# Patient Record
(demographics unavailable — no encounter records)

---

## 2025-04-21 NOTE — PHYSICAL EXAM
[Normal Breath Sounds] : Normal breath sounds [Normal Heart Sounds] : normal heart sounds [Normal Rate and Rhythm] : normal rate and rhythm [No Rash or Lesion] : No rash or lesion [Alert] : alert [Oriented to Person] : oriented to person [Oriented to Place] : oriented to place [Oriented to Time] : oriented to time [Calm] : calm [Exam Deferred] : exam was deferred [de-identified] : Soft/ND [de-identified] : Left anterior healed incision and drainage site of perianal abscess. [de-identified] : NAD [de-identified] : NC/AT [de-identified] : Normal  [de-identified] : +ROM/BASSETT [de-identified] : Intact

## 2025-04-21 NOTE — REVIEW OF SYSTEMS
[Feeling Tired] : feeling tired [As Noted in HPI] : as noted in HPI [Negative] : Heme/Lymph [FreeTextEntry3] : wears glasses [FreeTextEntry5] : HTN

## 2025-04-21 NOTE — ASSESSMENT
[FreeTextEntry1] : 48-year-old female status post incision and drainage of left anterior perianal abscess here for evaluation.  With the help of her son providing translation I explained that the patient will most likely have a recurrence as these are frequently associated with anal fistula.  The patient should undergo examination under anesthesia fistulotomy possible seton.  Plan:  Schedule examination under anesthesia fistulotomy possible seton.

## 2025-04-21 NOTE — HISTORY OF PRESENT ILLNESS
[FreeTextEntry1] : 47 y/o female presents here for rectal abscess. Patient went to [facility] on 4/7 for 3 days of left perianal swelling and pain. They did a CT scan which showed Left perianal abscess collection with surrounding inflammatory stranding. She states they did an incision and rain and  was given antibiotics. Patient states she's feeling better. Daily BM without straining.  PMH: dm2, gerd, HTN Patient states she has never had a colonoscopy in the past.

## 2025-06-03 NOTE — HISTORY OF PRESENT ILLNESS
[FreeTextEntry1] : 48 yr old female with past medical history remarkable for HTN, HLD presenting to establish care for type 2 diabetes mellitus. Records were reviewed. Patient recently hospitalized 4/7-4/9/25: presented with 3 days of perianal swelling and pain, found to have left perianal abscess, underwent I&D by colorectal surgery team and tx with abx. A1c 10.7% and endocrine team consulted   Regarding diabetes history: Diagnosed with T2DM in ~2023, followed by PCP previously  A1c trend: 10.7%- 4/2025  Current regimen: - lantus 25 units qhs - metformin 1000 mg BID - ozempic 0.25 mg weekly  Previous therapies tried: - januvia 100 mg and glimepiride 4 mg (discontinued after recent 4/2025 hospitalization)  Glycemic monitoring:  Hypoglycemia symptoms/events:  Diet: Breakfast- Lunch- Dinner- Snacks- Sugary beverages-  Physical activity:  Family history of DM in parents  Diabetes complications and comorbidities: Retinopathy- ***, last seen by ophthalmology on  Neuropathy-  Nephropathy- denies Last GFR: 120 (4/23/25)   TONY-  HTN- yes on losartan 25 mg Coronary artery disease- CVA- Dyslipidemia- yes on lipitor 20 mg    Social: Lives with  Works as  Tobacco use-  ETOH-

## 2025-06-03 NOTE — ASSESSMENT
[FreeTextEntry1] : 48 yr old female with past medical history remarkable for HTN, HLD presenting to establish care for type 2 diabetes mellitus.    Type 2 Diabetes ***controlled with HbA1c ***%  Target HbA1c <7% Pre-prandial target  mg/dL Post-prandial target <180 mg/dL  Medication regimen: - continue metformin 1000 mg bid - continue lantus 25 units daily - ***increase ozempic We discussed the pros and cons of GLP-1/GLP-1 + GIP, including the benefits of glucose lowering, weight loss, cardiovascular and renal benefits and the warnings for pancreatitis, medullary thyroid cancer, and acute kidney injury. Patient is agreeable with starting a medication of this class.  I counseled that patient that if severe abdominal pain and nausea and vomiting occurs, the pt should stop GLP and go to the ER. We also discussed the more mild side effect of abdominal discomfort/nausea with GLP initiation, which should improve over time. The patient was instructed to let me know if the side effects are intolerable/do not improve.  Lifestyle modifications: Recommend increasing weekly physical activity with goal 150 minutes/week and dietary modifications- limiting sugary beverages and fried foods, incorporating more fruits/vegetables and whole grains in diet   Monitoring for complications: - Ophthalmology: due for retinopathy screening, referral provided today *** UTD, last eye exam on *** - Podiatry:  - Renal: check urine microalbumin today   HLD - Continue atorvastatin 20 mg daily  Essential HTN with goal BP<130/80 *** well controlled today - Continue losartan 25 mg daily  Obesity  ***BMI - Counseled on lifestyle modifications: increased weekly physical activity with goal 150 minutes/week and dietary modifications- limiting sugary beverages and fried foods, incorporating more fruits/vegetables and whole grains in diet

## 2025-07-21 NOTE — HISTORY OF PRESENT ILLNESS
[FreeTextEntry1] : This is a pleasant 48 yr old F seen today for Hx of Type 2 DM In April, she was admitted to [facility] with a perianal abscess and had I+D . During this hospital admission , the HBA1C was 11.7 % on 25 Patient is accompanied by her son today who is helping with translation.  Patient states that she needs another procedure, however, the  glucose levels need to be improved prior to the surgery  Dx with DM about 5  yrs ago Hx of GDM prior to that  Current regimen - Lantus 25 units once daily, Metformin 1000 mg BID, Jardiance 25 mg once a day   Past Sx Hx -  x 3, both parents have DM

## 2025-07-21 NOTE — HISTORY OF PRESENT ILLNESS
[FreeTextEntry1] : This is a pleasant 48 yr old F seen today for Hx of Type 2 DM In April, she was admitted to [facility] with a perianal abscess and had I+D . During this hospital admission , the HBA1C was 11.7 % on 25 Patient is accompanied by her son today who is helping with translation.  Patient states that she needs another procedure, however, the  glucose levels need to be improved prior to the surgery  Dx with DM about 5  yrs ago Hx of GDM prior to that  Current regimen - Lantus 25 units once daily, Metformin 1000 mg BID, Jardiance 25 mg once a day   Past Sx Hx -  x 3, both parents have DM